# Patient Record
Sex: MALE | Race: BLACK OR AFRICAN AMERICAN | NOT HISPANIC OR LATINO | URBAN - METROPOLITAN AREA
[De-identification: names, ages, dates, MRNs, and addresses within clinical notes are randomized per-mention and may not be internally consistent; named-entity substitution may affect disease eponyms.]

---

## 2018-06-29 ENCOUNTER — OUTPATIENT (OUTPATIENT)
Dept: OUTPATIENT SERVICES | Facility: HOSPITAL | Age: 48
LOS: 1 days | Discharge: HOME | End: 2018-06-29

## 2018-06-29 DIAGNOSIS — E78.5 HYPERLIPIDEMIA, UNSPECIFIED: ICD-10-CM

## 2018-06-29 DIAGNOSIS — E55.9 VITAMIN D DEFICIENCY, UNSPECIFIED: ICD-10-CM

## 2018-06-29 DIAGNOSIS — N32.81 OVERACTIVE BLADDER: ICD-10-CM

## 2019-01-02 ENCOUNTER — OUTPATIENT (OUTPATIENT)
Dept: OUTPATIENT SERVICES | Facility: HOSPITAL | Age: 49
LOS: 1 days | Discharge: HOME | End: 2019-01-02

## 2019-01-02 DIAGNOSIS — N39.0 URINARY TRACT INFECTION, SITE NOT SPECIFIED: ICD-10-CM

## 2019-01-27 PROBLEM — Z00.00 ENCOUNTER FOR PREVENTIVE HEALTH EXAMINATION: Status: ACTIVE | Noted: 2019-01-27

## 2019-02-25 ENCOUNTER — OUTPATIENT (OUTPATIENT)
Dept: OUTPATIENT SERVICES | Facility: HOSPITAL | Age: 49
LOS: 1 days | Discharge: HOME | End: 2019-02-25

## 2019-02-25 ENCOUNTER — APPOINTMENT (OUTPATIENT)
Dept: UROLOGY | Facility: CLINIC | Age: 49
End: 2019-02-25
Payer: MEDICAID

## 2019-02-25 VITALS
DIASTOLIC BLOOD PRESSURE: 82 MMHG | SYSTOLIC BLOOD PRESSURE: 136 MMHG | HEART RATE: 78 BPM | HEIGHT: 67 IN | WEIGHT: 168 LBS | BODY MASS INDEX: 26.37 KG/M2

## 2019-02-25 DIAGNOSIS — Z78.9 OTHER SPECIFIED HEALTH STATUS: ICD-10-CM

## 2019-02-25 PROCEDURE — 99203 OFFICE O/P NEW LOW 30 MIN: CPT

## 2019-02-25 RX ORDER — AZITHROMYCIN 500 MG/1
500 TABLET, FILM COATED ORAL
Qty: 2 | Refills: 0 | Status: ACTIVE | COMMUNITY
Start: 2019-02-25 | End: 1900-01-01

## 2019-02-25 RX ORDER — CEFIXIME 400 MG/1
400 CAPSULE ORAL
Qty: 1 | Refills: 0 | Status: ACTIVE | COMMUNITY
Start: 2019-02-25 | End: 1900-01-01

## 2019-02-26 DIAGNOSIS — R35.0 FREQUENCY OF MICTURITION: ICD-10-CM

## 2019-02-26 LAB
APPEARANCE: CLEAR
BILIRUBIN URINE: NEGATIVE
BLOOD URINE: NEGATIVE
COLOR: YELLOW
GLUCOSE QUALITATIVE U: NEGATIVE MG/DL
KETONES URINE: NEGATIVE
LEUKOCYTE ESTERASE URINE: NEGATIVE
NITRITE URINE: NEGATIVE
PH URINE: 6
PROTEIN URINE: NEGATIVE MG/DL
SPECIFIC GRAVITY URINE: 1.02
UROBILINOGEN URINE: 0.2 MG/DL (ref 0.2–?)

## 2019-02-26 NOTE — ADDENDUM
[FreeTextEntry1] : I have seen and Evaluated the patient with LATHA Wilkerson\par I agree with the content of his progress note and the plan of care outlined\par \par 
Patient

## 2019-02-26 NOTE — LETTER BODY
[Dear  ___] : Dear  [unfilled], [Consult Letter:] : I had the pleasure of evaluating your patient, [unfilled]. [Please see my note below.] : Please see my note below. [Consult Closing:] : Thank you very much for allowing me to participate in the care of this patient.  If you have any questions, please do not hesitate to contact me. [Sincerely,] : Sincerely, [FreeTextEntry3] : Edson Reese MD, FACS\par

## 2019-02-26 NOTE — ASSESSMENT
[FreeTextEntry1] : This is a 48-year-old male with a history of bothersome lower urinary tract symptoms and Chlamydia exposure. He will begin cefixime 400 mg and azithromycin 1 g today, for exposure treatment, and will follow up with his PCP for STI testing tomorrow as scheduled. She understands the importance of followup regarding this issue and that he must bring us his test results from his PCP.\par A urinalysis and culture/sensitivity will be sent from the office today. He will obtain a renal/bladder sonogram and follow up in a few weeks for review.

## 2019-02-26 NOTE — PHYSICAL EXAM
[General Appearance - Well Developed] : well developed [General Appearance - Well Nourished] : well nourished [Normal Appearance] : normal appearance [Well Groomed] : well groomed [General Appearance - In No Acute Distress] : no acute distress [Edema] : no peripheral edema [Respiration, Rhythm And Depth] : normal respiratory rhythm and effort [Exaggerated Use Of Accessory Muscles For Inspiration] : no accessory muscle use [Abdomen Soft] : soft [Abdomen Tenderness] : non-tender [Costovertebral Angle Tenderness] : no ~M costovertebral angle tenderness [Urethral Meatus] : meatus normal [Penis Abnormality] : normal circumcised penis [Urinary Bladder Findings] : the bladder was normal on palpation [Scrotum] : the scrotum was normal [Epididymis] : the epididymides were normal [Testes Tenderness] : no tenderness of the testes [Testes Mass (___cm)] : there were no testicular masses [Normal Station and Gait] : the gait and station were normal for the patient's age [] : no rash [No Focal Deficits] : no focal deficits [Oriented To Time, Place, And Person] : oriented to person, place, and time [Affect] : the affect was normal [Mood] : the mood was normal [Not Anxious] : not anxious [Femoral Lymph Nodes Enlarged Bilaterally] : femoral [Inguinal Lymph Nodes Enlarged Bilaterally] : inguinal [FreeTextEntry1] : No Penile drainage with expression. Underwear dry

## 2019-02-27 LAB — BACTERIA UR CULT: NORMAL

## 2019-03-01 ENCOUNTER — FORM ENCOUNTER (OUTPATIENT)
Age: 49
End: 2019-03-01

## 2019-03-02 ENCOUNTER — OUTPATIENT (OUTPATIENT)
Dept: OUTPATIENT SERVICES | Facility: HOSPITAL | Age: 49
LOS: 1 days | Discharge: HOME | End: 2019-03-02

## 2019-03-02 DIAGNOSIS — R35.0 FREQUENCY OF MICTURITION: ICD-10-CM

## 2019-03-18 ENCOUNTER — APPOINTMENT (OUTPATIENT)
Dept: UROLOGY | Facility: CLINIC | Age: 49
End: 2019-03-18
Payer: MEDICAID

## 2019-03-18 VITALS — BODY MASS INDEX: 26.37 KG/M2 | HEIGHT: 67 IN | WEIGHT: 168 LBS

## 2019-03-18 DIAGNOSIS — A64 UNSPECIFIED SEXUALLY TRANSMITTED DISEASE: ICD-10-CM

## 2019-03-18 DIAGNOSIS — R35.0 FREQUENCY OF MICTURITION: ICD-10-CM

## 2019-03-18 PROCEDURE — 99213 OFFICE O/P EST LOW 20 MIN: CPT

## 2019-03-18 NOTE — PHYSICAL EXAM
[General Appearance - Well Nourished] : well nourished [Normal Appearance] : normal appearance [General Appearance - Well Developed] : well developed [Well Groomed] : well groomed [General Appearance - In No Acute Distress] : no acute distress [Edema] : no peripheral edema [] : no respiratory distress [Exaggerated Use Of Accessory Muscles For Inspiration] : no accessory muscle use [Respiration, Rhythm And Depth] : normal respiratory rhythm and effort [Affect] : the affect was normal [Mood] : the mood was normal [Oriented To Time, Place, And Person] : oriented to person, place, and time [No Focal Deficits] : no focal deficits [Not Anxious] : not anxious [Normal Station and Gait] : the gait and station were normal for the patient's age

## 2019-03-18 NOTE — ADDENDUM
[FreeTextEntry1] : Documented by Erick Wilkerson acting as a scribe for Dr. Edson Reese \par \par All medical record entries made by the Scribe were at my,  direction and\par personally dictated by me on 3/7/2019.  I have reviewed the chart and agree that the record\par accurately reflects my personal performance of the history, physical exam, procedure and imaging.  \par  \par \par \par \par

## 2019-03-18 NOTE — HISTORY OF PRESENT ILLNESS
[Urinary Urgency] : urinary urgency [Urinary Frequency] : urinary frequency [Nocturia] : nocturia [Bladder Spasm] : bladder spasm [Abdominal Pain] : abdominal pain [None] : None [FreeTextEntry1] : Alfred is a 48-year-old male who we have been following for suprapubic discomfort and bothersome lower urinary tract symptoms. The symptoms started approximately one month ago and had been getting progressively worse. He is complaining of feelings of incomplete bladder emptying with severe frequency/urgency.\par He was treated for similar symptoms approximately 6 or 7 years ago. He was placed on an unknown medication which helped resolve his symptoms.\par \par Additionally, he reports that he just found out that he was exposed to Chlamydia. He states he symptoms have been ongoing and prior to him being exposed to Chlamydia. \par \par At his last visit, he was treated for chlamydia exposure with azithromycin and and Suprax. He states post antibiotic use his symptoms mostly resolved. He is now voiding well with some abdominal discomfort.\par \par He was scheduled to undergo STI testing with his PCP however, he did not do so.\par \par UA C&S negative\par \par Renal/bladder sonogram negative [Erectile Dysfunction] : no Erectile Dysfunction [Dysuria] : no dysuria

## 2019-03-18 NOTE — ASSESSMENT
[FreeTextEntry1] : This is a 48-year-old male with a history of bothersome lower urinary tract symptoms and Chlamydia exposure. His symptoms improved post cefixime 400 mg and azithromycin 1 g for exposure.\par He will obtain full STI testing and follow up in a few weeks for review.\par \par

## 2019-03-26 ENCOUNTER — LABORATORY RESULT (OUTPATIENT)
Age: 49
End: 2019-03-26

## 2019-03-26 ENCOUNTER — OUTPATIENT (OUTPATIENT)
Dept: OUTPATIENT SERVICES | Facility: HOSPITAL | Age: 49
LOS: 1 days | Discharge: HOME | End: 2019-03-26

## 2019-03-26 DIAGNOSIS — A64 UNSPECIFIED SEXUALLY TRANSMITTED DISEASE: ICD-10-CM

## 2019-03-27 LAB
HBV CORE IGG+IGM SER QL: NONREACTIVE
HBV CORE IGM SER QL: NONREACTIVE
HBV SURFACE AB SER QL: NONREACTIVE
HBV SURFACE AG SER QL: NONREACTIVE
HCV AB SER QL: NONREACTIVE
HCV S/CO RATIO: 0.08 S/CO
HIV1+2 AB SPEC QL IA.RAPID: NONREACTIVE
T PALLIDUM AB SER QL IA: NEGATIVE

## 2019-03-28 LAB
HSV 1+2 IGG SER IA-IMP: NEGATIVE
HSV 1+2 IGG SER IA-IMP: POSITIVE
HSV1 IGG SER QL: 34.6 INDEX
HSV2 IGG SER QL: 0.1 INDEX

## 2019-03-29 LAB
HSV1 IGM SER QL: NORMAL TITER
HSV2 AB FLD-ACNC: NORMAL TITER

## 2019-04-01 ENCOUNTER — APPOINTMENT (OUTPATIENT)
Dept: UROLOGY | Facility: CLINIC | Age: 49
End: 2019-04-01
Payer: MEDICAID

## 2019-04-01 VITALS — BODY MASS INDEX: 26.37 KG/M2 | HEIGHT: 67 IN | WEIGHT: 168 LBS

## 2019-04-01 PROCEDURE — 99213 OFFICE O/P EST LOW 20 MIN: CPT

## 2019-04-01 NOTE — HISTORY OF PRESENT ILLNESS
[Urinary Urgency] : urinary urgency [Urinary Frequency] : urinary frequency [Nocturia] : nocturia [Bladder Spasm] : bladder spasm [Abdominal Pain] : abdominal pain [None] : None [FreeTextEntry1] : 48 year-old male here to review the results of his recent testing\par \par GC /Chlamydia - negative\par trichomonas - negative\par Hep B/C - negative\par Syphilis - negative\par HIV - negative\par HSV 1/2 - negative\par \par all the results were conveyed in detail\par  [Erectile Dysfunction] : no Erectile Dysfunction [Dysuria] : no dysuria

## 2019-04-01 NOTE — ASSESSMENT
[Urinary Symptom or Sign (788.99\R39.89)] : implantation [FreeTextEntry1] :  48-year-old male with a history of bothersome lower urinary tract symptoms and Chlamydia exposure. His symptoms improved post cefixime 400 mg and azithromycin 1 g for exposure.\par \par his lab results were all negative\par results reviewed with patient\par safe sex practices re-inforced\par \par - he will F/U as needed\par \par

## 2020-01-30 ENCOUNTER — TRANSCRIPTION ENCOUNTER (OUTPATIENT)
Age: 50
End: 2020-01-30

## 2020-02-08 ENCOUNTER — EMERGENCY (EMERGENCY)
Facility: HOSPITAL | Age: 50
LOS: 0 days | Discharge: HOME | End: 2020-02-08
Attending: EMERGENCY MEDICINE | Admitting: EMERGENCY MEDICINE
Payer: MEDICAID

## 2020-02-08 VITALS
DIASTOLIC BLOOD PRESSURE: 78 MMHG | OXYGEN SATURATION: 98 % | TEMPERATURE: 98 F | RESPIRATION RATE: 18 BRPM | SYSTOLIC BLOOD PRESSURE: 131 MMHG | WEIGHT: 167.99 LBS | HEART RATE: 82 BPM

## 2020-02-08 DIAGNOSIS — R39.198 OTHER DIFFICULTIES WITH MICTURITION: ICD-10-CM

## 2020-02-08 DIAGNOSIS — R10.9 UNSPECIFIED ABDOMINAL PAIN: ICD-10-CM

## 2020-02-08 DIAGNOSIS — R35.0 FREQUENCY OF MICTURITION: ICD-10-CM

## 2020-02-08 DIAGNOSIS — R10.30 LOWER ABDOMINAL PAIN, UNSPECIFIED: ICD-10-CM

## 2020-02-08 LAB
APPEARANCE UR: CLEAR — SIGNIFICANT CHANGE UP
BILIRUB UR-MCNC: NEGATIVE — SIGNIFICANT CHANGE UP
COLOR SPEC: SIGNIFICANT CHANGE UP
DIFF PNL FLD: NEGATIVE — SIGNIFICANT CHANGE UP
GLUCOSE UR QL: NEGATIVE — SIGNIFICANT CHANGE UP
KETONES UR-MCNC: NEGATIVE — SIGNIFICANT CHANGE UP
LEUKOCYTE ESTERASE UR-ACNC: NEGATIVE — SIGNIFICANT CHANGE UP
NITRITE UR-MCNC: NEGATIVE — SIGNIFICANT CHANGE UP
PH UR: 6 — SIGNIFICANT CHANGE UP (ref 5–8)
PROT UR-MCNC: NEGATIVE — SIGNIFICANT CHANGE UP
SP GR SPEC: 1.02 — SIGNIFICANT CHANGE UP (ref 1.01–1.02)
UROBILINOGEN FLD QL: SIGNIFICANT CHANGE UP

## 2020-02-08 PROCEDURE — 99284 EMERGENCY DEPT VISIT MOD MDM: CPT

## 2020-02-08 RX ORDER — CEFTRIAXONE 500 MG/1
250 INJECTION, POWDER, FOR SOLUTION INTRAMUSCULAR; INTRAVENOUS ONCE
Refills: 0 | Status: COMPLETED | OUTPATIENT
Start: 2020-02-08 | End: 2020-02-08

## 2020-02-08 RX ORDER — AZITHROMYCIN 500 MG/1
1 TABLET, FILM COATED ORAL ONCE
Refills: 0 | Status: COMPLETED | OUTPATIENT
Start: 2020-02-08 | End: 2020-02-08

## 2020-02-08 RX ADMIN — AZITHROMYCIN 1 GRAM(S): 500 TABLET, FILM COATED ORAL at 07:45

## 2020-02-08 RX ADMIN — CEFTRIAXONE 250 MILLIGRAM(S): 500 INJECTION, POWDER, FOR SOLUTION INTRAMUSCULAR; INTRAVENOUS at 07:45

## 2020-02-08 NOTE — ED ADULT NURSE NOTE - CHPI ED NUR SYMPTOMS NEG
no abdominal distension/no dysuria/no hematuria/no nausea/no diarrhea/no blood in stool/no chills/no fever

## 2020-02-08 NOTE — ED PROVIDER NOTE - NS ED ROS FT
Constitutional: (-) fever  Eyes/ENT: (-) blurry vision, (-) epistaxis  Cardiovascular: (-) chest pain, (-) syncope  Respiratory: (-) cough, (-) shortness of breath  Gastrointestinal: (-) vomiting, (-) diarrhea  : (+) suprapubic pain, (+) incomplete void  Musculoskeletal: (-) neck pain, (-) back pain, (-) joint pain  Integumentary: (-) rash, (-) edema  Neurological: (-) headache, (-) altered mental status  Allergic/Immunologic: (-) pruritus

## 2020-02-08 NOTE — ED PROVIDER NOTE - OBJECTIVE STATEMENT
49y M no ppmh presents for eval of suprapubic pain. Pt states he has 2days of mild suprapubic discomfort, aggravated with urination and activity, relieved at rest. Pt had similar sxs x1yr ago that was STI. Denies fever, ha, cp, sob, weakness, numbness, hematuria, flank pain, n/v/d/c

## 2020-02-08 NOTE — ED ADULT NURSE NOTE - OBJECTIVE STATEMENT
Pt c/o abd pain and suprapubic, denies frequency, no burning no odor. C/o inability to empty the bladder fully

## 2020-02-08 NOTE — ED PROVIDER NOTE - CLINICAL SUMMARY MEDICAL DECISION MAKING FREE TEXT BOX
a/p; LUTS, more likely STI than UTI, will do ua/cx, gc/chlam and treat for STI w ctx and azithro, f/u with his urologist 1 week, strict return precautions provided, safe sex practices reinforced, H&P not consistent w pyelo, renal colic, testicular torsion, incarcerated hernia.

## 2020-02-08 NOTE — ED PROVIDER NOTE - NSFOLLOWUPINSTRUCTIONS_ED_ALL_ED_FT
Follow up with PMD in 1-2 days.    A sexually transmitted disease (STD) is a disease or infection that may be passed (transmitted) from person to person, usually during sexual activity. This may happen by way of saliva, semen, blood, vaginal mucus, or urine. Symptoms vary depending on the type of STD acquired and may include pain in the groin, discharge, and lesions or a rash. If you are started on an antibiotic, take it exactly as instructed. Avoid sexual contact of any kind until cleared by a health care professional. Contact your sexual partner(s) to inform them of your diagnosis so that they may be tested and treated as well.    SEEK IMMEDIATE MEDICAL CARE IF YOU HAVE ANY OF THE FOLLOWING SYMPTOMS: severe abdominal pain, high fever, nausea/vomiting, or unintended weight loss.

## 2020-02-08 NOTE — ED PROVIDER NOTE - ATTENDING CONTRIBUTION TO CARE
49M no pmh, p/w 1 week of suprapubic pressure constant nonradiating assoc w urinary frequency and sensation of incomplete bladder emptying. states he had same exact symptoms 1 yr ago seen by Dr Reese, states he had STI exposure however ua/cx and STI testing neg, renal sono neg, pt treated w azithromycin and Suprax and symptoms resolved. admits to unprotected intercourse. No dysuria, hematuria. No flank pain. No testicular pain. No lesions or discharge. No fever.  No cp, sob. No heavy lifting, bulge's.     on exam, AFVSS, well christen nad, ncat, eomi, perrla, mmm, lctab, rrr nl s1s2 no mrg, abd soft ntnd, no cvat,  agree w LATHA Skaggs, circumcised, cremasteric intact bilat, aaox3, no focal deficits, no le edema or calf ttp,     a/p; LUTS, more likely STI than UTI, will do ua/cx, gc/chlam and treat for STI w ctx and azithro, f/u with his urologist 1 week, strict return precautions provided, safe sex practices reinforced, H&P not consistent w pyelo, renal colic, testicular torsion, incarcerated hernia.

## 2020-02-08 NOTE — ED ADULT NURSE NOTE - GASTROINTESTINAL ASSESSMENT
April 20, 2018       Patient: Kartik Mahoney   YOB: 1975   Date of Visit: 4/20/2018         To Whom It May Concern:    It is my medical opinion that Kartik Mahoney be excused from work tomorrow due to illness.    If you have any questions or concerns, please don't hesitate to call [phone]          Sincerely,          DAVID Chase.  Electronically Signed     
WDL

## 2020-02-08 NOTE — ED ADULT TRIAGE NOTE - CHIEF COMPLAINT QUOTE
Pt came c/o suprapubic pain, increase in frequency in urination and unable to empty his bladder fully x 2 days.

## 2020-02-08 NOTE — ED PROVIDER NOTE - NSFOLLOWUPCLINICS_GEN_ALL_ED_FT
[facility]  Medicine  [address], [place]   Phone: [phone]  Fax:   Follow Up Time:

## 2020-02-08 NOTE — ED PROVIDER NOTE - PHYSICAL EXAMINATION
CONST: NAD  EYES: Sclera and conjunctiva clear.   ENT: No nasal discharge. Oropharynx normal appearing, no erythema or exudates. No abscess or swelling. Uvula midline.   NECK: Non-tender, no meningeal signs. normal ROM. supple   CARD: S1 S2; No jvd  RESP: Equal BS B/L, No wheezes, rhonchi or rales. No distress  GI: Soft, non-tender, non-distended. no cva tenderness. normal BS  : External appearance wnl, nontender testicles, no dc, (-) phrens, (+) cremasteric, no palpable hernia  MS: Normal ROM in all extremities. pulses 2 +. no calf tenderness or swelling  SKIN: Warm, dry, no acute rashes. Good turgor  NEURO: A&Ox3, No focal deficits. Strength 5/5 with no sensory deficits. Steady gait.

## 2020-02-08 NOTE — ED PROVIDER NOTE - PATIENT PORTAL LINK FT
You can access the FollowMyHealth Patient Portal offered by [facility] by registering at the following website: [email or web address]. By joining [employer]’s FollowMyHealth portal, you will also be able to view your health information using other applications (apps) compatible with our system.

## 2020-02-09 LAB
CULTURE RESULTS: NO GROWTH — SIGNIFICANT CHANGE UP
SPECIMEN SOURCE: SIGNIFICANT CHANGE UP

## 2020-02-14 PROBLEM — Z78.9 OTHER SPECIFIED HEALTH STATUS: Chronic | Status: ACTIVE | Noted: 2020-02-08

## 2020-02-24 ENCOUNTER — APPOINTMENT (OUTPATIENT)
Dept: UROLOGY | Facility: CLINIC | Age: 50
End: 2020-02-24
Payer: MEDICAID

## 2020-02-24 VITALS — WEIGHT: 168 LBS | BODY MASS INDEX: 26.37 KG/M2 | HEIGHT: 67 IN

## 2020-02-24 DIAGNOSIS — R10.9 UNSPECIFIED ABDOMINAL PAIN: ICD-10-CM

## 2020-02-24 DIAGNOSIS — R39.9 UNSPECIFIED SYMPTOMS AND SIGNS INVOLVING THE GENITOURINARY SYSTEM: ICD-10-CM

## 2020-02-24 PROCEDURE — 99214 OFFICE O/P EST MOD 30 MIN: CPT

## 2020-02-24 RX ORDER — SULFAMETHOXAZOLE AND TRIMETHOPRIM 800; 160 MG/1; MG/1
800-160 TABLET ORAL TWICE DAILY
Qty: 28 | Refills: 2 | Status: ACTIVE | COMMUNITY
Start: 2020-02-24 | End: 1900-01-01

## 2020-02-24 NOTE — ASSESSMENT
[Urinary Symptom or Sign (788.99\R39.89)] : implantation [FreeTextEntry1] : 49-year-old male with a history of bothersome lower urinary tract symptoms \par \par his lab results were all negative\par \par - CT scan a/p to look for stones and peritoneal pathology\par - UA, Cx, Cytology\par - PSA\par - f/u to review above in 3 weeks\par \par

## 2020-02-24 NOTE — HISTORY OF PRESENT ILLNESS
[Urinary Urgency] : urinary urgency [Urinary Frequency] : urinary frequency [Nocturia] : nocturia [Bladder Spasm] : bladder spasm [Abdominal Pain] : abdominal pain [None] : None [FreeTextEntry1] : 49 year-old male c/o suprapubic pain\par last seen in er with above\par \par negative UA and culture\par no in retention [Erectile Dysfunction] : no Erectile Dysfunction [Dysuria] : no dysuria

## 2020-02-27 LAB
BACTERIA UR CULT: NORMAL
URINE CYTOLOGY: NORMAL

## 2020-03-28 ENCOUNTER — OUTPATIENT (OUTPATIENT)
Dept: OUTPATIENT SERVICES | Facility: HOSPITAL | Age: 50
LOS: 1 days | Discharge: HOME | End: 2020-03-28
Payer: MEDICAID

## 2020-03-28 ENCOUNTER — RESULT REVIEW (OUTPATIENT)
Age: 50
End: 2020-03-28

## 2020-03-28 DIAGNOSIS — R10.9 UNSPECIFIED ABDOMINAL PAIN: ICD-10-CM

## 2020-03-28 DIAGNOSIS — R35.0 FREQUENCY OF MICTURITION: ICD-10-CM

## 2020-03-28 PROCEDURE — 74176 CT ABD & PELVIS W/O CONTRAST: CPT | Mod: 26

## 2020-04-06 DIAGNOSIS — R93.2 ABNORMAL FINDINGS ON DIAGNOSTIC IMAGING OF LIVER AND BILIARY TRACT: ICD-10-CM

## 2020-04-06 LAB
ANION GAP SERPL CALC-SCNC: 14 MMOL/L
BUN SERPL-MCNC: 16 MG/DL
CALCIUM SERPL-MCNC: 9.7 MG/DL
CHLORIDE SERPL-SCNC: 98 MMOL/L
CO2 SERPL-SCNC: 26 MMOL/L
CREAT SERPL-MCNC: 1.1 MG/DL
GLUCOSE SERPL-MCNC: 91 MG/DL
POTASSIUM SERPL-SCNC: 4.6 MMOL/L
PSA FREE FLD-MCNC: 25 %
PSA FREE SERPL-MCNC: 0.25 NG/ML
PSA SERPL-MCNC: 0.98 NG/ML
SODIUM SERPL-SCNC: 138 MMOL/L

## 2020-05-04 ENCOUNTER — APPOINTMENT (OUTPATIENT)
Dept: UROLOGY | Facility: CLINIC | Age: 50
End: 2020-05-04

## 2020-07-11 ENCOUNTER — EMERGENCY (EMERGENCY)
Facility: HOSPITAL | Age: 50
LOS: 0 days | Discharge: HOME | End: 2020-07-11
Attending: EMERGENCY MEDICINE | Admitting: EMERGENCY MEDICINE
Payer: MEDICAID

## 2020-07-11 VITALS
SYSTOLIC BLOOD PRESSURE: 143 MMHG | TEMPERATURE: 99 F | DIASTOLIC BLOOD PRESSURE: 84 MMHG | RESPIRATION RATE: 18 BRPM | OXYGEN SATURATION: 100 % | HEART RATE: 80 BPM
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
## 2020-07-11 DIAGNOSIS — R23.4 CHANGES IN SKIN TEXTURE: ICD-10-CM

## 2020-07-11 DIAGNOSIS — Z23 ENCOUNTER FOR IMMUNIZATION: ICD-10-CM

## 2020-07-11 DIAGNOSIS — L53.9 ERYTHEMATOUS CONDITION, UNSPECIFIED: ICD-10-CM
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
## 2020-07-11 DIAGNOSIS — M79.672 PAIN IN LEFT FOOT: ICD-10-CM

## 2020-07-11 PROCEDURE — 73630 X-RAY EXAM OF FOOT: CPT | Mod: 26,LT

## 2020-07-11 PROCEDURE — 99283 EMERGENCY DEPT VISIT LOW MDM: CPT

## 2020-07-11 RX ORDER — TETANUS TOXOID, REDUCED DIPHTHERIA TOXOID AND ACELLULAR PERTUSSIS VACCINE, ADSORBED 5; 2.5; 8; 8; 2.5 [IU]/.5ML; [IU]/.5ML; UG/.5ML; UG/.5ML; UG/.5ML
0.5 SUSPENSION INTRAMUSCULAR ONCE
Refills: 0 | Status: COMPLETED | OUTPATIENT
Start: 2020-07-11 | End: 2020-07-11

## 2020-07-11 RX ADMIN — TETANUS TOXOID, REDUCED DIPHTHERIA TOXOID AND ACELLULAR PERTUSSIS VACCINE, ADSORBED 0.5 MILLILITER(S): 5; 2.5; 8; 8; 2.5 SUSPENSION INTRAMUSCULAR at 19:00

## 2020-07-11 NOTE — ED PROVIDER NOTE - ATTENDING CONTRIBUTION TO CARE
48 yo m with no pmh, presents with L foot pain x 2 weeks.  pt says he stepped on a thumbtack without shoes.  pt says he noted a worsening lump.  no fever, no chills.  pt say only has pain on the immediate site.  exam: +induration medial/distal to the heel., no fluctuance, mildly erythematous imp: pt with indurated area at the sole of foot where he stepped on thumbtack, mildly erythematous, tender, will start on abx and pt to f/u with podiatry outpt

## 2020-07-11 NOTE — ED PROVIDER NOTE - NSFOLLOWUPINSTRUCTIONS_ED_ALL_ED_FT
Foot Pain    Many things can cause foot pain. Some common causes are:    An injury.  A sprain.  Arthritis.  Blisters.  Bunions.    Follow these instructions at home:  Pay attention to any changes in your symptoms. Take these actions to help with your discomfort:    If directed, put ice on the affected area:    Put ice in a plastic bag.  Place a towel between your skin and the bag.  Leave the ice on for 15–20 minutes, 3?4 times a day for 2 days.    Take over-the-counter and prescription medicines only as told by your health care provider.  Wear comfortable, supportive shoes that fit you well. Do not wear high heels.  Do not stand or walk for long periods of time.  Do not lift a lot of weight. This can put added pressure on your feet.  Do stretches to relieve foot pain and stiffness as told by your health care provider.  Rub your foot gently.  Keep your feet clean and dry.    Contact a health care provider if:  Your pain does not get better after a few days of self-care.  Your pain gets worse.  You cannot stand on your foot.  Get help right away if:  Your foot is numb or tingling.  Your foot or toes are swollen.  Your foot or toes turn white or blue.  You have warmth and redness along your foot.  This information is not intended to replace advice given to you by your health care provider. Make sure you discuss any questions you have with your health care provider.

## 2020-07-11 NOTE — ED PROVIDER NOTE - NSFOLLOWUPCLINICS_GEN_ALL_ED_FT
[facility]  Podiatry  .  [place]   Phone: [phone]  Fax:   Follow Up Time:

## 2020-07-11 NOTE — ED PROVIDER NOTE - PATIENT PORTAL LINK FT
You can access the FollowMyHealth Patient Portal offered by [facility] by registering at the following website: [email or web address]. By joining [employer]’s FollowMyHealth portal, you will also be able to view your health information using other applications (apps) compatible with our system.

## 2020-07-11 NOTE — ED PROVIDER NOTE - OBJECTIVE STATEMENT
pt c/o left foot pain for 2 weeks since stepping on a thumbtack with barefeet. pain is sharp, nonradiating, moderate. denies exacerbating or relieving factors. Denies fever/chill/HA/dizziness/chest pain/palpitation/sob/abd pain/n/v/d/ black stool/bloody stool/urinary sxs

## 2020-07-11 NOTE — ED PROVIDER NOTE - PROGRESS NOTE DETAILS
Pt aware that we will have official radiology read pending, and may result in change of xray read.  Pt voices understanding of use of medications, instructions for care, and reasons to return or to go to ED  Discussed rest, ice, compression, and elevation with patient.   Discussed NSAIDs for anti-inflammatory and pain relief.  Patient advised to f/u with podiatry

## 2020-07-11 NOTE — ED PROVIDER NOTE - CLINICAL SUMMARY MEDICAL DECISION MAKING FREE TEXT BOX
pt with indurated area at the sole of foot where he stepped on thumbtack, mildly erythematous, tender, will start on abx and pt to f/u with podiatry outpt

## 2020-07-11 NOTE — ED PROVIDER NOTE - PHYSICAL EXAMINATION
CONSTITUTIONAL: Well-appearing; well-nourished; in no apparent distress.   MS: No evidence of trauma or deformity. Normal ROM in all four extremities; non-tender to palpation; distal pulses are normal.   SKIN: no wound noted; Normal for age and race; warm; dry; good turgor; no apparent lesions or exudate.   NEURO/PSYCH: A & O x 4; grossly unremarkable. mood and manner are appropriate. Grooming and personal hygiene are appropriate. No apparent thoughts of harm to self or others.

## 2020-11-03 NOTE — ED ADULT NURSE NOTE - NURSING GU BLADDER
non-tender/non-distended Tranexamic Acid Counseling:  Patient advised of the small risk of bleeding problems with tranexamic acid. They were also instructed to call if they developed any nausea, vomiting or diarrhea. All of the patient's questions and concerns were addressed.

## 2021-02-04 ENCOUNTER — APPOINTMENT (OUTPATIENT)
Dept: UROLOGY | Facility: CLINIC | Age: 51
End: 2021-02-04
Payer: MEDICAID

## 2021-02-04 VITALS — HEIGHT: 67 IN | TEMPERATURE: 97.2 F | WEIGHT: 168 LBS | BODY MASS INDEX: 26.37 KG/M2

## 2021-02-04 DIAGNOSIS — M62.838 OTHER MUSCLE SPASM: ICD-10-CM

## 2021-02-04 PROCEDURE — 99072 ADDL SUPL MATRL&STAF TM PHE: CPT

## 2021-02-04 PROCEDURE — 99214 OFFICE O/P EST MOD 30 MIN: CPT

## 2021-02-05 PROBLEM — M62.838 MUSCLE SPASM: Status: ACTIVE | Noted: 2021-02-05

## 2021-02-05 NOTE — ASSESSMENT
[Chronic Prostatitis (601.1\N41.1)] : of ~T knee [FreeTextEntry1] : 51 yo with persistent suprapubic pain\par intermittent in nature\par very bothersome\par \par - will begin flomax\par - as per note from April - he did not have the MRI \par not certain if Dr. Pagan had obtained the report\par - will obtain a LIVER MRI to further assess\par - repeat PSA\par - f/u in 6 weeks to re-assess

## 2021-02-05 NOTE — LETTER BODY
[Dear  ___] : Dear  [unfilled], [Consult Letter:] : I had the pleasure of evaluating your patient, [unfilled]. [Please see my note below.] : Please see my note below. [Sincerely,] : Sincerely, [FreeTextEntry3] : Edson Reese MD, FACS\par

## 2021-02-05 NOTE — HISTORY OF PRESENT ILLNESS
[Urinary Urgency] : urinary urgency [Urinary Frequency] : urinary frequency [Nocturia] : nocturia [Bladder Spasm] : bladder spasm [Abdominal Pain] : abdominal pain [None] : None [FreeTextEntry1] : 50 year-old male c/o suprapubic pain\par and discomfort\par \par prior workup consisted of a CT scan and STD panel\par all normal except for a liver lesion that required an MRI for further characterization\par the report was forwarded to Dr. Pagan's office to further evaluate \par \par the patient is presently afebrile\par no distress\par \par 3/2020\par negative UA and culture\par PSA 0.98 and 25% free\par \par he is not on alpha blockers [Erectile Dysfunction] : no Erectile Dysfunction [Dysuria] : no dysuria

## 2021-02-09 LAB
ANION GAP SERPL CALC-SCNC: 10 MMOL/L
BUN SERPL-MCNC: 14 MG/DL
CALCIUM SERPL-MCNC: 10.1 MG/DL
CHLORIDE SERPL-SCNC: 98 MMOL/L
CO2 SERPL-SCNC: 27 MMOL/L
CREAT SERPL-MCNC: 1.2 MG/DL
GLUCOSE SERPL-MCNC: 98 MG/DL
POTASSIUM SERPL-SCNC: 4 MMOL/L
SODIUM SERPL-SCNC: 135 MMOL/L

## 2021-02-10 LAB
APPEARANCE: CLEAR
BILIRUBIN URINE: NEGATIVE
BLOOD URINE: NEGATIVE
COLOR: NORMAL
GLUCOSE QUALITATIVE U: NEGATIVE
KETONES URINE: NEGATIVE
LEUKOCYTE ESTERASE URINE: NEGATIVE
NITRITE URINE: NEGATIVE
PH URINE: 5.5
PROTEIN URINE: NEGATIVE
SPECIFIC GRAVITY URINE: 1.02
UROBILINOGEN URINE: NORMAL

## 2021-02-16 ENCOUNTER — NON-APPOINTMENT (OUTPATIENT)
Age: 51
End: 2021-02-16

## 2021-02-16 LAB
BACTERIA FLD CULT: NORMAL
PSA FREE FLD-MCNC: 26 %
PSA FREE SERPL-MCNC: 0.28 NG/ML
PSA SERPL-MCNC: 1.07 NG/ML

## 2021-03-11 ENCOUNTER — APPOINTMENT (OUTPATIENT)
Dept: UROLOGY | Facility: CLINIC | Age: 51
End: 2021-03-11
Payer: MEDICAID

## 2021-03-11 DIAGNOSIS — R93.49 ABNORMAL RADIOLOGIC FINDINGS ON DIAGNOSITIC IMAGING OF OTHER URINARY ORGANS: ICD-10-CM

## 2021-03-11 PROCEDURE — 99213 OFFICE O/P EST LOW 20 MIN: CPT

## 2021-03-11 PROCEDURE — 99072 ADDL SUPL MATRL&STAF TM PHE: CPT

## 2021-03-16 PROBLEM — R93.49 ABNORMAL FINDINGS ON DIAGNOSTIC IMAGING OF URINARY ORGANS: Status: ACTIVE | Noted: 2021-02-05

## 2021-03-16 NOTE — ASSESSMENT
[FreeTextEntry1] : 49 yo with persistent suprapubic pain\par intermittent in nature\par very bothersome\par \par -  flomax as needed \par - MRI of the abdomen - LIVER protocol\par - telemed to review

## 2021-03-16 NOTE — HISTORY OF PRESENT ILLNESS
[Urinary Urgency] : urinary urgency [Urinary Frequency] : urinary frequency [Nocturia] : nocturia [Bladder Spasm] : bladder spasm [Abdominal Pain] : abdominal pain [None] : None [FreeTextEntry1] : 50 year-old male c/o suprapubic pain\par and discomfort - seen 2/4/2021 for above complaint\par \par prior workup consisted of a CT scan and STD panel\par all normal except for a liver lesion that required an MRI for further characterization\par - MRI was not performed \par \par the patient is presently afebrile\par no distress\par \par 3/2020\par negative UA and culture\par PSA 0.98 and 25% free\par semen culture no growth\par  [Erectile Dysfunction] : no Erectile Dysfunction [Dysuria] : no dysuria

## 2021-03-25 ENCOUNTER — APPOINTMENT (OUTPATIENT)
Dept: UROLOGY | Facility: CLINIC | Age: 51
End: 2021-03-25
Payer: MEDICAID

## 2021-03-25 VITALS — TEMPERATURE: 97 F | HEIGHT: 67 IN | WEIGHT: 168 LBS | BODY MASS INDEX: 26.37 KG/M2

## 2021-03-25 PROCEDURE — 99213 OFFICE O/P EST LOW 20 MIN: CPT

## 2021-03-25 PROCEDURE — 99072 ADDL SUPL MATRL&STAF TM PHE: CPT

## 2021-03-25 NOTE — ASSESSMENT
[FreeTextEntry1] : Patient presents to office today to discuss MRI of abdomen Liver protocol. Patient did not get MRI due to anxiety about IV needle. Patient states that he will get MRI this week as he is now mentally prepared.\par \par Plan\par -Follow up telehealth to review MRI.  \par \par

## 2021-03-25 NOTE — ADDENDUM
[FreeTextEntry1] : Documented by LATHA William acting as a scribe for Dr. Edson Reese \par \par All medical record entries made by the Scribe were at my,  direction and\par personally dictated by me.  I have reviewed the chart and agree that the record\par accurately reflects my personal performance of the history, physical exam, procedure and imaging.  \par  \par \par

## 2021-03-25 NOTE — REVIEW OF SYSTEMS
[see HPI] : see HPI [Fever] : no fever [Chills] : no chills [Sore Throat] : no sore throat [Chest Pain] : no chest pain [Shortness Of Breath] : no shortness of breath [Cough] : no cough [Wheezing] : no wheezing [Abdominal Pain] : no abdominal pain [Vomiting] : no vomiting [Constipation] : no constipation [Diarrhea] : no diarrhea

## 2021-03-25 NOTE — PHYSICAL EXAM
[Normal Appearance] : normal appearance [Well Groomed] : well groomed [General Appearance - In No Acute Distress] : no acute distress [Skin Color & Pigmentation] : normal skin color and pigmentation [Edema] : no peripheral edema [] : no respiratory distress [Oriented To Time, Place, And Person] : oriented to person, place, and time [Normal Station and Gait] : the gait and station were normal for the patient's age [No Focal Deficits] : no focal deficits

## 2021-03-25 NOTE — HISTORY OF PRESENT ILLNESS
[None] : no symptoms [FreeTextEntry1] : 50 year-old male c/o suprapubic pain\par and discomfort - seen 2/4/2021 for above complaint\par \par prior workup consisted of a CT scan and STD panel\par all normal except for a liver lesion that required an MRI for further characterization\par - MRI was not performed again. Patient states that he did not know the MRI required IV needle and had anxiety. Patient states that he is mentally prepared and will get the MRI this week. \par \par the patient is presently afebrile\par no distress\par

## 2021-04-02 ENCOUNTER — OUTPATIENT (OUTPATIENT)
Dept: OUTPATIENT SERVICES | Facility: HOSPITAL | Age: 51
LOS: 1 days | Discharge: HOME | End: 2021-04-02
Payer: MEDICAID

## 2021-04-02 DIAGNOSIS — R93.2 ABNORMAL FINDINGS ON DIAGNOSTIC IMAGING OF LIVER AND BILIARY TRACT: ICD-10-CM

## 2021-04-02 PROCEDURE — 74183 MRI ABD W/O CNTR FLWD CNTR: CPT | Mod: 26

## 2021-04-08 ENCOUNTER — APPOINTMENT (OUTPATIENT)
Dept: UROLOGY | Facility: CLINIC | Age: 51
End: 2021-04-08
Payer: MEDICAID

## 2021-04-08 DIAGNOSIS — N41.1 CHRONIC PROSTATITIS: ICD-10-CM

## 2021-04-08 PROCEDURE — ZZZZZ: CPT

## 2021-04-12 PROBLEM — N41.1 CHRONIC PROSTATITIS: Status: ACTIVE | Noted: 2021-02-05

## 2021-04-12 NOTE — ASSESSMENT
[FreeTextEntry1] : 51 yo with abnormal imaging of the liver\par s/p MRI\par \par - MRI reviewed with the patient\par - PSA reviewed\par - all lab studies reviewed \par - chronic prostatitis\par avoidance of spicy food and caffeine re-iterated\par - f/u as needed [Chronic Prostatitis (601.1\N41.1)] : of ~T knee

## 2021-04-12 NOTE — PHYSICAL EXAM
[] : no respiratory distress [Oriented To Time, Place, And Person] : oriented to person, place, and time [Not Anxious] : not anxious

## 2021-04-12 NOTE — HISTORY OF PRESENT ILLNESS
[Home] : at home, [unfilled] , at the time of the visit. [Medical Office: ([facility])___] : at the medical office located in  [Verbal consent obtained from patient] : the patient, [unfilled] [FreeTextEntry1] : telephone [phone]\par email [email or web address]\par \par 50 year-old male c/o suprapubic pain\par and discomfort - seen 2/4/2021 for above complaint\par \par prior workup consisted of a CT scan and STD panel\par all normal except for a liver lesion that required an MRI for further characterization\par - MRI was not performed \par \par the patient is presently afebrile\par no distress\par \par 3/2020\par negative UA and culture\par PSA 0.98 and 25% free\par semen culture no growth\par \par MRI 4/2021\par \par IMPRESSION:\par 1. Scattered subcentimeter simple hepatic cysts and a 1.2 cm caudate lobe hemangioma; no suspicious hepatic lesions.\par 2. Otherwise, unremarkable MRI abdomen.

## 2022-07-01 ENCOUNTER — NON-APPOINTMENT (OUTPATIENT)
Age: 52
End: 2022-07-01

## 2022-07-14 ENCOUNTER — APPOINTMENT (OUTPATIENT)
Dept: UROLOGY | Facility: CLINIC | Age: 52
End: 2022-07-14

## 2022-07-14 DIAGNOSIS — N41.9 INFLAMMATORY DISEASE OF PROSTATE, UNSPECIFIED: ICD-10-CM

## 2022-07-14 PROCEDURE — 99214 OFFICE O/P EST MOD 30 MIN: CPT

## 2022-07-21 PROBLEM — N41.9 PROSTATITIS: Status: ACTIVE | Noted: 2021-03-16

## 2022-07-21 NOTE — ASSESSMENT
[FreeTextEntry1] : 50 yo with persistent suprapubic pain\par intermittent in nature\par at present very bothersome\par \par - UA, CUlture, cytology\par - PSA\par - telehealth to review studies\par \par

## 2022-07-21 NOTE — HISTORY OF PRESENT ILLNESS
[FreeTextEntry1] : 51 year-old male seen prior with c/o suprapubic pain\par and discomfort - seen 3/2021 for above complaint - was worked up and suspected prostatitis\par \par UA, CUlture obtained\par PSA obtained\par PVR 20 cc\par \par prior workup (last seen 3/2021)\par \par prior workup consisted of a CT scan and STD panel\par all normal except for a liver lesion that required an MRI for further characterization\par - MRI was performed 4/2021\par scattered subcentimeter simple cysts and 1.2 cm caudate lobe \par \par 3/2020\par negative UA and culture\par PSA 0.98 and 25% free\par semen culture no growth\par \par MRI 4/2021\par \par IMPRESSION:\par 1. Scattered subcentimeter simple hepatic cysts and a 1.2 cm caudate lobe hemangioma; no suspicious hepatic lesions.\par 2. Otherwise, unremarkable MRI abdomen.

## 2022-07-22 LAB
PSA FREE FLD-MCNC: 13 %
PSA FREE SERPL-MCNC: 0.27 NG/ML
PSA SERPL-MCNC: 2.09 NG/ML

## 2022-07-25 ENCOUNTER — NON-APPOINTMENT (OUTPATIENT)
Age: 52
End: 2022-07-25

## 2022-09-01 ENCOUNTER — APPOINTMENT (OUTPATIENT)
Dept: UROLOGY | Facility: CLINIC | Age: 52
End: 2022-09-01

## 2022-09-01 VITALS
HEART RATE: 73 BPM | WEIGHT: 168 LBS | SYSTOLIC BLOOD PRESSURE: 140 MMHG | TEMPERATURE: 98 F | DIASTOLIC BLOOD PRESSURE: 88 MMHG | HEIGHT: 67 IN | BODY MASS INDEX: 26.37 KG/M2

## 2022-09-01 DIAGNOSIS — R97.20 ELEVATED PROSTATE, SPECIFIC ANTIGEN [PSA]: ICD-10-CM

## 2022-09-01 PROCEDURE — 99214 OFFICE O/P EST MOD 30 MIN: CPT

## 2022-09-02 LAB
ANION GAP SERPL CALC-SCNC: 12 MMOL/L
APPEARANCE: CLEAR
BILIRUBIN URINE: NEGATIVE
BLOOD URINE: NEGATIVE
BUN SERPL-MCNC: 13 MG/DL
CALCIUM SERPL-MCNC: 9.8 MG/DL
CHLORIDE SERPL-SCNC: 104 MMOL/L
CO2 SERPL-SCNC: 24 MMOL/L
COLOR: YELLOW
CREAT SERPL-MCNC: 1.2 MG/DL
EGFR: 74 ML/MIN/1.73M2
GLUCOSE QUALITATIVE U: NEGATIVE
GLUCOSE SERPL-MCNC: 106 MG/DL
KETONES URINE: NEGATIVE
LEUKOCYTE ESTERASE URINE: NEGATIVE
NITRITE URINE: NEGATIVE
PH URINE: 6
POTASSIUM SERPL-SCNC: 4.6 MMOL/L
PROTEIN URINE: NORMAL
SODIUM SERPL-SCNC: 140 MMOL/L
SPECIFIC GRAVITY URINE: 1.03
UROBILINOGEN URINE: NORMAL

## 2022-09-02 NOTE — ASSESSMENT
[FreeTextEntry1] : 52-year-old -American male with rising PSA for the last year.\par PSA this year is 2.09 ng/mL and PSA last year was 1.07 ng/mL.\par PSA is elevated for patients age. \par His urinary symptoms are under control with tamsulosin 0.4 mg daily.\par \par I reviewed the PSA with the patient and given rising PSA and age, recommend MRI of prostate to assess for suspicious prostatic lesions that can be targeted on MRI guided fusion biopsy.  We will also be able to accurately measure the size of the prostate with the MRI and calculate PSA density.\par \par Plan\par -PSA and basic metabolic panel\par -UA and U Culture. \par -MRI of prostate\par -Follow-up 3 to 4 weeks TeleMed to review

## 2022-09-02 NOTE — ADDENDUM
[FreeTextEntry1] : Documented by LATHA William acting as a scribe for Dr. Edson Reese \par \par All medical record entries made by the Scribe were at my, Dr. Reese direction and\par personally dictated by me.  I have reviewed the chart and agree that the record\par accurately reflects my personal performance of the history, physical exam, procedure and imaging.  \par  \par \par

## 2022-09-02 NOTE — HISTORY OF PRESENT ILLNESS
[FreeTextEntry1] : Alfred is a 52-year-old -American male with prior complaints of suprapubic pain and discomfort and suspected prostatitis in the past.  Symptoms have been alleviated with tamsulosin 0.4 mg daily.\par \par Patient presents to office today to review his most recent PSA.  PSA done July 2022 was 2.09 ng/mL with 13% free.  1 year ago PSA was 1.07 ng/mL with 26% free.  2 years ago PSA was 0.98 ng/mL.\par \par Patient does report urinary frequency.  He is adhering to behavioral modifications.  He has decreased caffeine intake has decreased energy drink intake.\par \par Prior imaging:\par prior workup consisted of a CT scan and STD panel\par all normal except for a liver lesion that required an MRI for further characterization\par - MRI abdomen was performed 4/2021\par scattered subcentimeter simple cysts and 1.2 cm caudate lobe \par

## 2022-09-06 LAB
BACTERIA UR CULT: NORMAL
PSA FREE FLD-MCNC: 15 %
PSA FREE SERPL-MCNC: 0.27 NG/ML
PSA SERPL-MCNC: 1.76 NG/ML

## 2022-10-03 ENCOUNTER — APPOINTMENT (OUTPATIENT)
Dept: UROLOGY | Facility: CLINIC | Age: 52
End: 2022-10-03

## 2023-01-19 ENCOUNTER — APPOINTMENT (OUTPATIENT)
Dept: UROLOGY | Facility: CLINIC | Age: 53
End: 2023-01-19
Payer: MEDICAID

## 2023-01-19 VITALS
HEIGHT: 67 IN | DIASTOLIC BLOOD PRESSURE: 80 MMHG | BODY MASS INDEX: 33.74 KG/M2 | SYSTOLIC BLOOD PRESSURE: 120 MMHG | WEIGHT: 215 LBS

## 2023-01-19 DIAGNOSIS — R97.20 ELEVATED PROSTATE, SPECIFIC ANTIGEN [PSA]: ICD-10-CM

## 2023-01-19 DIAGNOSIS — N13.8 BENIGN PROSTATIC HYPERPLASIA WITH LOWER URINARY TRACT SYMPMS: ICD-10-CM

## 2023-01-19 DIAGNOSIS — N40.1 BENIGN PROSTATIC HYPERPLASIA WITH LOWER URINARY TRACT SYMPMS: ICD-10-CM

## 2023-01-19 PROCEDURE — 99214 OFFICE O/P EST MOD 30 MIN: CPT

## 2023-01-19 RX ORDER — TAMSULOSIN HYDROCHLORIDE 0.4 MG/1
0.4 CAPSULE ORAL
Qty: 30 | Refills: 11 | Status: COMPLETED | COMMUNITY
Start: 2021-02-04 | End: 2023-01-19

## 2023-01-21 PROBLEM — N40.1 BENIGN LOCALIZED HYPERPLASIA OF PROSTATE WITH URINARY OBSTRUCTION: Status: ACTIVE | Noted: 2023-01-21

## 2023-01-21 PROBLEM — R97.20 ELEVATED PROSTATE SPECIFIC ANTIGEN (PSA): Status: ACTIVE | Noted: 2022-09-02

## 2023-01-21 NOTE — HISTORY OF PRESENT ILLNESS
[FreeTextEntry1] : Alfred is a 52 year old  male with prior complaints of suprapubic pain and discomfort and suspected prostatitis in the past. He has been managed on tamsulosin 0.4 mg daily which patient reports is not helping with urinary frequency. He continues to adhere to behavioral modifications including decreasing caffeine intake. Patient request to try a different medication. \par \par Last visit, patient PSA increased from prior year. \par PSA July 2022 was 2.09 ng/mL\par PSA 2021 1.07 ng/mL\par PSA 2020 0.98 ng/mL \par \par Most recent PSA done 09/2022- 1.76 ng/mL. \par BMP glucose 106. eGFR 74 and creatinine 1.2. \par \par MRI of prostate 12/28/2022\par -No findings on MRI suspicious for prostate tumor. PIRADS 1. \par -diffuse T2 hypointensity and avid enhancement in the peripheral zone, possibly inflammatory.

## 2023-01-21 NOTE — ASSESSMENT
[FreeTextEntry1] : 52-year-old -American male with rising PSA for the last year.\par Repeat PSA has decreased and MRI revealed PIRADS 1 and evidence of inflammation. This was reviewed with patient in detail. \par \par Patient states that he continues to have urinary frequency despite tamsulosin and he requests to try a different medication. He denies ever having a heart attack and denies taking any nitroglycerine medication. \par Will try daily Tadalafil 5 mg for BPH. Side effects of tadalafil reviewed with patient. \par \par Plan\par -PSA in 1 year\par -Follow up 1 year\par -Patient to call office in 2 months to discuss medication efficacy. If patient continues to have urinary frequency, patient will be referred for Urodynamic evaluation. \par -discontinue tamsulosin\par -Start Tadalafil 5 mg daily for BPH. \par -Follow up with medical doctor to check sugars. Patient states he has appointment tomorrow.

## 2023-03-29 ENCOUNTER — EMERGENCY (EMERGENCY)
Facility: HOSPITAL | Age: 53
LOS: 0 days | Discharge: HOME | End: 2023-03-30
Attending: EMERGENCY MEDICINE
Payer: MEDICAID

## 2023-03-29 DIAGNOSIS — S61.551A OPEN BITE OF RIGHT WRIST, INITIAL ENCOUNTER: ICD-10-CM
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
## 2023-03-29 PROCEDURE — 99283 EMERGENCY DEPT VISIT LOW MDM: CPT

## 2023-03-29 PROCEDURE — 99282 EMERGENCY DEPT VISIT SF MDM: CPT

## 2023-04-10 NOTE — ED PROVIDER NOTE - NS ED ATTENDING STATEMENT MOD
This was a shared visit with the JONO. I reviewed and verified the documentation and independently performed the documented:

## 2023-04-10 NOTE — ED PROVIDER NOTE - CLINICAL SUMMARY MEDICAL DECISION MAKING FREE TEXT BOX
Dog bite > 24 hours old.  No signs of infection.  No suturable wounds.  DC with Abx and f/u with PMD.  Strict return instructions discussed. Dog bite > 24 hours old.  No signs of infection.  No suturable wounds.  DC with Abx and f/u with PMD.  Strict return instructions discussed.  SEE DOWNTIME CHART FOR FURTHER INFORMATION

## 2024-01-22 ENCOUNTER — APPOINTMENT (OUTPATIENT)
Dept: UROLOGY | Facility: CLINIC | Age: 54
End: 2024-01-22
Payer: MEDICAID

## 2024-01-22 VITALS
WEIGHT: 215 LBS | RESPIRATION RATE: 18 BRPM | TEMPERATURE: 97.8 F | SYSTOLIC BLOOD PRESSURE: 136 MMHG | HEART RATE: 86 BPM | DIASTOLIC BLOOD PRESSURE: 87 MMHG | HEIGHT: 67 IN | BODY MASS INDEX: 33.74 KG/M2

## 2024-01-22 PROCEDURE — 99214 OFFICE O/P EST MOD 30 MIN: CPT | Mod: 25

## 2024-01-22 PROCEDURE — 51798 US URINE CAPACITY MEASURE: CPT

## 2024-01-22 RX ORDER — TADALAFIL 5 MG/1
5 TABLET ORAL
Qty: 90 | Refills: 3 | Status: ACTIVE | COMMUNITY
Start: 2024-01-22 | End: 1900-01-01

## 2024-01-22 RX ORDER — MIRABEGRON 25 MG/1
25 TABLET, FILM COATED, EXTENDED RELEASE ORAL
Qty: 30 | Refills: 11 | Status: ACTIVE | COMMUNITY
Start: 2024-01-22 | End: 1900-01-01

## 2024-01-22 RX ORDER — TADALAFIL 5 MG/1
5 TABLET ORAL
Qty: 90 | Refills: 0 | Status: COMPLETED | COMMUNITY
Start: 2023-01-19 | End: 2024-01-22

## 2024-01-22 NOTE — HISTORY OF PRESENT ILLNESS
[FreeTextEntry1] : 53 year old  male with prior complaints of suprapubic pain and discomfort and suspected prostatitis in the past.  He had been managed on tamsulosin 0.4 mg daily which patient reported was not helping with urinary frequency.  This was changed for Tadalafil daily 5 mg - with improvement in night time voiding but no improvement in daytime frequency this is problematic since he is a   He continues to adhere to behavioral modifications including decreasing caffeine intake. Patient request to try a different medication.   Last visit, patient PSA increased from prior year.  PSA drawn this weekend - pending PSA done 09/2022- 1.76 ng/mL.  PSA July 2022 was 2.09 ng/mL PSA 2021 1.07 ng/mL PSA 2020 0.98 ng/mL   MRI of prostate 12/28/2022 -No findings on MRI suspicious for prostate tumor. PIRADS 1.  -diffuse T2 hypointensity and avid enhancement in the peripheral zone, possibly inflammatory.   PVR 80 cc today

## 2024-01-22 NOTE — ASSESSMENT
[FreeTextEntry1] : 53 year old  male with prior complaints of suprapubic pain and discomfort and suspected prostatitis in the past.  He had been managed on tamsulosin 0.4 mg daily which patient reported was not helping with urinary frequency.  This was changed for Tadalafil daily 5 mg - with improvement in night time voiding but no improvement in daytime frequency this is problematic since he is a   He continues to adhere to behavioral modifications including decreasing caffeine intake. Patient request to try a different medication.   Last visit, patient PSA increased from prior year.  PSA drawn this weekend - pending PSA done 09/2022- 1.76 ng/mL.  PSA July 2022 was 2.09 ng/mL PSA 2021 1.07 ng/mL PSA 2020 0.98 ng/mL   MRI of prostate 12/28/2022 -No findings on MRI suspicious for prostate tumor. PIRADS 1.  -diffuse T2 hypointensity and avid enhancement in the peripheral zone, possibly inflammatory.   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

## 2024-02-01 LAB
APPEARANCE: CLEAR
BILIRUBIN URINE: NEGATIVE
BLOOD URINE: NEGATIVE
COLOR: YELLOW
GLUCOSE QUALITATIVE U: NEGATIVE MG/DL
KETONES URINE: NEGATIVE MG/DL
LEUKOCYTE ESTERASE URINE: NEGATIVE
NITRITE URINE: NEGATIVE
PH URINE: 5.5
PROTEIN URINE: NEGATIVE MG/DL
SPECIFIC GRAVITY URINE: 1.02
UROBILINOGEN URINE: 0.2 MG/DL

## 2024-03-04 ENCOUNTER — APPOINTMENT (OUTPATIENT)
Dept: UROLOGY | Facility: CLINIC | Age: 54
End: 2024-03-04

## 2024-05-10 ENCOUNTER — APPOINTMENT (OUTPATIENT)
Dept: PAIN MANAGEMENT | Facility: CLINIC | Age: 54
End: 2024-05-10

## 2024-05-17 ENCOUNTER — APPOINTMENT (OUTPATIENT)
Dept: ORTHOPEDIC SURGERY | Facility: CLINIC | Age: 54
End: 2024-05-17
Payer: COMMERCIAL

## 2024-05-17 ENCOUNTER — NON-APPOINTMENT (OUTPATIENT)
Age: 54
End: 2024-05-17

## 2024-05-17 DIAGNOSIS — S39.012A STRAIN OF MUSCLE, FASCIA AND TENDON OF LOWER BACK, INITIAL ENCOUNTER: ICD-10-CM

## 2024-05-17 PROCEDURE — 99203 OFFICE O/P NEW LOW 30 MIN: CPT

## 2024-05-17 PROCEDURE — 72110 X-RAY EXAM L-2 SPINE 4/>VWS: CPT

## 2024-05-17 RX ORDER — TIZANIDINE 4 MG/1
4 TABLET ORAL
Qty: 7 | Refills: 0 | Status: ACTIVE | COMMUNITY
Start: 2024-05-17 | End: 1900-01-01

## 2024-05-17 RX ORDER — IBUPROFEN 600 MG/1
600 TABLET, FILM COATED ORAL
Refills: 0 | Status: ACTIVE | COMMUNITY

## 2024-05-17 RX ORDER — IBUPROFEN 800 MG/1
800 TABLET, FILM COATED ORAL 3 TIMES DAILY
Qty: 90 | Refills: 0 | Status: ACTIVE | COMMUNITY
Start: 2024-05-17 | End: 1900-01-01

## 2024-05-17 NOTE — HISTORY OF PRESENT ILLNESS
[de-identified] : 53-year-old male here for evaluation status post right lumbar injury.  Patient reports about 2 to 3 weeks ago he was picking up heavy objects when he felt a strain to his right lumbar musculature.  Reports pain to the area since the time of injury along with shooting pains down his right lower extremity.  He reports difficulty with ambulation since the time of injury.  He was seen initially in the ER where he was prescribed muscle relaxers and anti-inflammatories with relief.  This pain is overall improving but has persisted.

## 2024-05-17 NOTE — IMAGING
[de-identified] : Physical examination of the lumbar spine: No swelling, ecchymosis, erythema appreciated.  Skin is intact.  No midline tenderness is appreciated.  Mild tenderness of the right lumbar musculature and right buttock.  Positive straight leg raise.  Limited range of motion secondary to pain.  No instability is appreciated.  Some weakness appreciated throughout.  Calf is soft and nontender.  Negative Homans' sign.  Sensorimotor intact distally.  Neuro vas intact.  X-rays of lumbar spine taken in the office today:  No acute fractures, subluxations, or dislocations.

## 2024-05-17 NOTE — DISCUSSION/SUMMARY
[de-identified] : My clinical suspicion is high for a strain of the lumbar musculature and a subsequent lumbar radiculopathy given the patient's history, physical examination findings, and x-ray findings.  I recommended anti-inflammatory medication.  Ibuprofen 800 mg sent to patient's pharmacy to be taken as needed for pain. Benefits discussed. Confirmed no contraindication to NSAIDs. Also, tizanidine sent to patient's pharmacy to be taken as needed for pain.  Advised patient that this medication may make the patient drowsy and so to avoid any driving or operating heavy machinery when taking it. Red flag symptoms discussed.  Risks were discussed. Patient expresses full understanding.  Prescription for physical therapy was provided for strengthening, stretching, and range of motion of the lumbar musculature.  I recommended patient rest, ice, compress, and elevate the lower extremities regularly. Encouraged activity modification as tolerable. Encouraged gentle range of motion to avoid stiffness.  All questions and concerns addressed to patient's satisfaction. Patient expresses full understanding of treatment plan. Will give the patient follow-up in 6 weeks with our spine specialist for repeat evaluation and treatment.

## 2024-07-01 ENCOUNTER — APPOINTMENT (OUTPATIENT)
Dept: ORTHOPEDIC SURGERY | Facility: CLINIC | Age: 54
End: 2024-07-01
Payer: COMMERCIAL

## 2024-07-01 DIAGNOSIS — S39.012D STRAIN OF MUSCLE, FASCIA AND TENDON OF LOWER BACK, SUBSEQUENT ENCOUNTER: ICD-10-CM

## 2024-07-01 PROBLEM — M54.16 LUMBAR RADICULAR PAIN: Status: ACTIVE | Noted: 2024-07-01

## 2024-07-01 PROCEDURE — 99213 OFFICE O/P EST LOW 20 MIN: CPT

## 2024-07-09 ENCOUNTER — APPOINTMENT (OUTPATIENT)
Dept: MRI IMAGING | Facility: CLINIC | Age: 54
End: 2024-07-09
Payer: COMMERCIAL

## 2024-07-09 PROCEDURE — 72148 MRI LUMBAR SPINE W/O DYE: CPT

## 2024-07-19 ENCOUNTER — NON-APPOINTMENT (OUTPATIENT)
Age: 54
End: 2024-07-19

## 2024-07-19 ENCOUNTER — APPOINTMENT (OUTPATIENT)
Dept: ORTHOPEDIC SURGERY | Facility: CLINIC | Age: 54
End: 2024-07-19
Payer: COMMERCIAL

## 2024-07-19 DIAGNOSIS — M54.16 RADICULOPATHY, LUMBAR REGION: ICD-10-CM

## 2024-07-19 PROCEDURE — 99203 OFFICE O/P NEW LOW 30 MIN: CPT

## 2024-07-24 ENCOUNTER — APPOINTMENT (OUTPATIENT)
Dept: PAIN MANAGEMENT | Facility: CLINIC | Age: 54
End: 2024-07-24

## 2024-09-04 ENCOUNTER — APPOINTMENT (OUTPATIENT)
Dept: PAIN MANAGEMENT | Facility: CLINIC | Age: 54
End: 2024-09-04
Payer: COMMERCIAL

## 2024-09-04 DIAGNOSIS — M54.50 LOW BACK PAIN, UNSPECIFIED: ICD-10-CM

## 2024-09-04 DIAGNOSIS — S39.012A STRAIN OF MUSCLE, FASCIA AND TENDON OF LOWER BACK, INITIAL ENCOUNTER: ICD-10-CM

## 2024-09-04 PROCEDURE — 99204 OFFICE O/P NEW MOD 45 MIN: CPT

## 2024-09-04 RX ORDER — METHOCARBAMOL 750 MG/1
750 TABLET, FILM COATED ORAL 3 TIMES DAILY
Qty: 180 | Refills: 0 | Status: ACTIVE | COMMUNITY
Start: 2024-09-04 | End: 1900-01-01

## 2024-09-04 NOTE — HISTORY OF PRESENT ILLNESS
[FreeTextEntry1] : This is a 54-year-old male here to establish care for right sided lower back pain. Initially his pain was associated with numbness tingling which has started to dissipate. The pain is present in the right buttock. He works as a  and when he sits still the pain continues to be bothersome.  He is referred to me by Dr. Cazares for pain management consultation. The MRI of the lumbar spine was reviewed with the patient and is documented below. He is attending physical therapy which provides him some relief.  He was utilizing ibuprofen 800 mg which started to impact his stomach. He is requesting an alternative medication for pain at this time. He is currently not working but should be able to return full duty in 4 weeks.

## 2024-09-04 NOTE — PHYSICAL EXAM
[Normal Coordination] : normal coordination [Normal DTR UE/LE] : normal DTR UE/LE  [Normal Sensation] : normal sensation [Normal Mood and Affect] : normal mood and affect [Oriented] : oriented [Able to Communicate] : able to communicate [Well Developed] : well developed [Well Nourished] : well nourished [Flexion] : flexion [Extension] : extension [] : negative sitting straight leg raise

## 2024-09-04 NOTE — DATA REVIEWED
[FreeTextEntry1] : MRI of the lumbar spine dated 7/9/2024 demonstrates mild straightening of sagittal lordosis.  Mild L5-S1 spinal thesis with mild bulging of the annulus.  Central disc herniation indents the thecal sac and mildly impinges on both descending S1 roots.  L4-5 mild spondylosis without root compression.

## 2024-09-04 NOTE — ASSESSMENT
[FreeTextEntry1] : This is a 54-year-old male here to establish care for lower back pain.  Initially his pain was associated with radicular features into the right lower extremity.  This is since dissipated and he only notes pain in the right buttock.  The pain has been ongoing for 3 months and has improved slightly with physical therapy.  I will send Robaxin to the pharmacy for symptom control and he will follow-up in 4 weeks for reassessment.  He was given a note keeping him out of work for the next 4 weeks however I anticipate that at his next follow-up he will be able to return to work full duty.  All this patient's questions were answered and the conversation was understood well.  Entered by Shellie Desir, acting as scribe for Dr. Stuart.  Documentation recorded by the scribe, in my presence, accurately reflects the service I personally performed, and the decisions made by me with my edits as appropriate.     Thank you for allowing me to assist in the management of this patient.     Best Regards,     Moriah Stuart M.D., FAAPMR     Diplomate, American Board of Physical Medicine and Rehabilitation Diplomate, American Board of Pain Medicine

## 2024-10-03 ENCOUNTER — APPOINTMENT (OUTPATIENT)
Dept: PAIN MANAGEMENT | Facility: CLINIC | Age: 54
End: 2024-10-03
Payer: COMMERCIAL

## 2024-10-03 DIAGNOSIS — M54.50 LOW BACK PAIN, UNSPECIFIED: ICD-10-CM

## 2024-10-03 DIAGNOSIS — S39.012A STRAIN OF MUSCLE, FASCIA AND TENDON OF LOWER BACK, INITIAL ENCOUNTER: ICD-10-CM

## 2024-10-03 PROCEDURE — 99214 OFFICE O/P EST MOD 30 MIN: CPT

## 2024-10-03 NOTE — HISTORY OF PRESENT ILLNESS
[FreeTextEntry1] : ORIGINAL PRESENTATION: This is a 54-year-old male here to establish care for right sided lower back pain. Initially his pain was associated with numbness tingling which has started to dissipate. The pain is present in the right buttock. He works as a  and when he sits still the pain continues to be bothersome.  He is referred to me by Dr. Cazares for pain management consultation. The MRI of the lumbar spine was reviewed with the patient and is documented below. He is attending physical therapy which provides him some relief.  He was utilizing ibuprofen 800 mg which started to impact his stomach. He is requesting an alternative medication for pain at this time. He is currently not working but should be able to return full duty in 4 weeks.  PATIENT PRESENTS FOR FOLLOW UP: He is under our care for right sided lower back pain. The pain is present in the right buttock.  Initially his pain was associated with numbness tingling which has started to dissipate. He was provided with Methocarbamol 750 mg 2 tabs TID which causes some dizziness. I advise that he not take the medication when driving. He believes he is able to return to work and I will fill out his paperwork.

## 2024-10-03 NOTE — ASSESSMENT
[FreeTextEntry1] : This is a 54-year-old male here to establish care for lower back pain.  Initially his pain was associated with radicular features into the right lower extremity. This has since dissipated, and he only notes pain in the right buttock.  He is taking Methocarbamol 750 mg 2 tabs TID. Overall, his pain is improving with conservative measures. He will return to work full duty no restrictions. Paperwork was filled out and scanned into his chart. He will follow up in 2 months for reassessment.  All this patient's questions were answered and the conversation was understood well.  Entered by Shellie Desir, acting as scribe for Dr. Stuart.  Documentation recorded by the scribe, in my presence, accurately reflects the service I personally performed, and the decisions made by me with my edits as appropriate.     Thank you for allowing me to assist in the management of this patient.     Best Regards,     Moriah Stuart M.D., FAAPMR     Diplomate, American Board of Physical Medicine and Rehabilitation Diplomate, American Board of Pain Medicine

## 2024-10-25 ENCOUNTER — NON-APPOINTMENT (OUTPATIENT)
Age: 54
End: 2024-10-25

## 2024-11-14 PROBLEM — Z78.9 PATIENT DENIES MEDICAL PROBLEMS: Status: RESOLVED | Noted: 2024-11-14 | Resolved: 2024-11-14

## 2024-11-21 ENCOUNTER — APPOINTMENT (OUTPATIENT)
Dept: GASTROENTEROLOGY | Facility: CLINIC | Age: 54
End: 2024-11-21
Payer: COMMERCIAL

## 2024-11-21 VITALS — HEIGHT: 67 IN | WEIGHT: 217 LBS | BODY MASS INDEX: 34.06 KG/M2

## 2024-11-21 DIAGNOSIS — Z12.11 ENCOUNTER FOR SCREENING FOR MALIGNANT NEOPLASM OF COLON: ICD-10-CM

## 2024-11-21 DIAGNOSIS — Z78.9 OTHER SPECIFIED HEALTH STATUS: ICD-10-CM

## 2024-11-21 DIAGNOSIS — K21.9 GASTRO-ESOPHAGEAL REFLUX DISEASE W/OUT ESOPHAGITIS: ICD-10-CM

## 2024-11-21 PROCEDURE — 99204 OFFICE O/P NEW MOD 45 MIN: CPT

## 2024-11-21 RX ORDER — SODIUM SULFATE, POTASSIUM SULFATE AND MAGNESIUM SULFATE 1.6; 3.13; 17.5 G/177ML; G/177ML; G/177ML
17.5-3.13-1.6 SOLUTION ORAL
Qty: 1 | Refills: 0 | Status: ACTIVE | COMMUNITY
Start: 2024-11-21 | End: 1900-01-01

## 2024-12-06 ENCOUNTER — APPOINTMENT (OUTPATIENT)
Dept: PAIN MANAGEMENT | Facility: CLINIC | Age: 54
End: 2024-12-06

## 2025-04-09 ENCOUNTER — RESULT REVIEW (OUTPATIENT)
Age: 55
End: 2025-04-09

## 2025-04-09 ENCOUNTER — TRANSCRIPTION ENCOUNTER (OUTPATIENT)
Age: 55
End: 2025-04-09

## 2025-04-09 ENCOUNTER — OUTPATIENT (OUTPATIENT)
Dept: OUTPATIENT SERVICES | Facility: HOSPITAL | Age: 55
LOS: 1 days | Discharge: HOME | End: 2025-04-09
Payer: COMMERCIAL

## 2025-04-09 VITALS
OXYGEN SATURATION: 97 % | HEART RATE: 98 BPM | SYSTOLIC BLOOD PRESSURE: 140 MMHG | RESPIRATION RATE: 22 BRPM | DIASTOLIC BLOOD PRESSURE: 70 MMHG

## 2025-04-09 VITALS
SYSTOLIC BLOOD PRESSURE: 142 MMHG | WEIGHT: 218.04 LBS | TEMPERATURE: 97 F | HEART RATE: 60 BPM | RESPIRATION RATE: 18 BRPM | HEIGHT: 67 IN | DIASTOLIC BLOOD PRESSURE: 82 MMHG | OXYGEN SATURATION: 100 %

## 2025-04-09 DIAGNOSIS — K21.9 GASTRO-ESOPHAGEAL REFLUX DISEASE WITHOUT ESOPHAGITIS: ICD-10-CM

## 2025-04-09 PROCEDURE — 88313 SPECIAL STAINS GROUP 2: CPT | Mod: 26

## 2025-04-09 PROCEDURE — 43239 EGD BIOPSY SINGLE/MULTIPLE: CPT | Mod: XS

## 2025-04-09 PROCEDURE — 88313 SPECIAL STAINS GROUP 2: CPT

## 2025-04-09 PROCEDURE — 88312 SPECIAL STAINS GROUP 1: CPT | Mod: 26

## 2025-04-09 PROCEDURE — 88312 SPECIAL STAINS GROUP 1: CPT

## 2025-04-09 PROCEDURE — 45380 COLONOSCOPY AND BIOPSY: CPT

## 2025-04-09 PROCEDURE — 88305 TISSUE EXAM BY PATHOLOGIST: CPT | Mod: 26

## 2025-04-09 PROCEDURE — 88305 TISSUE EXAM BY PATHOLOGIST: CPT

## 2025-04-09 RX ORDER — SODIUM CHLORIDE 9 G/1000ML
1000 INJECTION, SOLUTION INTRAVENOUS
Refills: 0 | Status: DISCONTINUED | OUTPATIENT
Start: 2025-04-09 | End: 2025-04-09

## 2025-04-09 NOTE — ASU DISCHARGE PLAN (ADULT/PEDIATRIC) - CARE PROVIDER_API CALL
Betty Howell  Gastroenterology  [address] [place] [address]  Phone: [phone]  Fax: [phone]  Follow Up Time:

## 2025-04-09 NOTE — ASU DISCHARGE PLAN (ADULT/PEDIATRIC) - NS MD DC FALL RISK RISK
For information on Fall & Injury Prevention, visit: [email or web address] OR  [email or web address] OR  https://www.cdc.gov/steadi/patient.html

## 2025-04-09 NOTE — ASU DISCHARGE PLAN (ADULT/PEDIATRIC) - FINANCIAL ASSISTANCE
[facility] provides services at a reduced cost to those who are determined to be eligible through [facility]’s financial assistance program. Information regarding [facility]’s financial assistance program can be found by going to [email or web address] or by calling [phone].

## 2025-04-09 NOTE — ASU PREOP CHECKLIST - LAST TOOK
clears Joanna Ramirez NP in Family Health  [address] [facility] [address], [address]  [place] [id number]  Phone: [phone]  Fax: [phone]  Follow Up Time: Routine

## 2025-04-11 LAB — SURGICAL PATHOLOGY STUDY: SIGNIFICANT CHANGE UP

## 2025-04-14 LAB — SURGICAL PATHOLOGY STUDY: SIGNIFICANT CHANGE UP

## 2025-04-16 DIAGNOSIS — Z12.11 ENCOUNTER FOR SCREENING FOR MALIGNANT NEOPLASM OF COLON: ICD-10-CM

## 2025-04-16 DIAGNOSIS — K29.50 UNSPECIFIED CHRONIC GASTRITIS WITHOUT BLEEDING: ICD-10-CM

## 2025-04-16 DIAGNOSIS — K64.4 RESIDUAL HEMORRHOIDAL SKIN TAGS: ICD-10-CM

## 2025-04-16 DIAGNOSIS — K44.9 DIAPHRAGMATIC HERNIA WITHOUT OBSTRUCTION OR GANGRENE: ICD-10-CM

## 2025-04-16 DIAGNOSIS — K57.30 DIVERTICULOSIS OF LARGE INTESTINE WITHOUT PERFORATION OR ABSCESS WITHOUT BLEEDING: ICD-10-CM

## 2025-04-16 DIAGNOSIS — K29.80 DUODENITIS WITHOUT BLEEDING: ICD-10-CM

## 2025-04-16 DIAGNOSIS — K63.5 POLYP OF COLON: ICD-10-CM

## 2025-05-20 ENCOUNTER — NON-APPOINTMENT (OUTPATIENT)
Age: 55
End: 2025-05-20

## 2025-05-20 ENCOUNTER — APPOINTMENT (OUTPATIENT)
Dept: GASTROENTEROLOGY | Facility: CLINIC | Age: 55
End: 2025-05-20
Payer: COMMERCIAL

## 2025-05-20 DIAGNOSIS — A04.8 OTHER SPECIFIED BACTERIAL INTESTINAL INFECTIONS: ICD-10-CM

## 2025-05-20 DIAGNOSIS — Z78.9 OTHER SPECIFIED HEALTH STATUS: ICD-10-CM

## 2025-05-20 DIAGNOSIS — K63.5 POLYP OF COLON: ICD-10-CM

## 2025-05-20 DIAGNOSIS — K31.A0 GASTRIC INTESTINAL METAPLASIA, UNSPECIFIED: ICD-10-CM

## 2025-05-20 PROCEDURE — 99214 OFFICE O/P EST MOD 30 MIN: CPT

## 2025-05-20 RX ORDER — PANTOPRAZOLE 40 MG/1
40 TABLET, DELAYED RELEASE ORAL TWICE DAILY
Qty: 28 | Refills: 0 | Status: ACTIVE | COMMUNITY
Start: 2025-05-20 | End: 1900-01-01

## 2025-05-20 RX ORDER — TETRACYCLINE HYDROCHLORIDE 500 MG/1
500 CAPSULE ORAL EVERY 6 HOURS
Qty: 56 | Refills: 0 | Status: ACTIVE | COMMUNITY
Start: 2025-05-20 | End: 1900-01-01

## 2025-05-20 RX ORDER — BISMUTH SUBSALICYLATE 262 MG/1
262 TABLET, CHEWABLE ORAL EVERY 6 HOURS
Qty: 112 | Refills: 0 | Status: ACTIVE | COMMUNITY
Start: 2025-05-20 | End: 1900-01-01

## 2025-05-20 RX ORDER — METRONIDAZOLE 500 MG/1
500 TABLET ORAL EVERY 6 HOURS
Qty: 56 | Refills: 0 | Status: ACTIVE | COMMUNITY
Start: 2025-05-20 | End: 1900-01-01

## 2025-09-12 ENCOUNTER — APPOINTMENT (OUTPATIENT)
Dept: PODIATRY | Facility: CLINIC | Age: 55
End: 2025-09-12

## 2025-09-12 DIAGNOSIS — M79.671 PAIN IN RIGHT FOOT: ICD-10-CM

## 2025-09-12 DIAGNOSIS — G62.89 OTHER SPECIFIED POLYNEUROPATHIES: ICD-10-CM

## 2025-09-12 DIAGNOSIS — L85.2 KERATOSIS PUNCTATA (PALMARIS ET PLANTARIS): ICD-10-CM

## 2025-09-12 PROCEDURE — 99203 OFFICE O/P NEW LOW 30 MIN: CPT

## 2025-09-16 PROBLEM — M79.671 RIGHT FOOT PAIN: Status: ACTIVE | Noted: 2025-09-16

## 2025-09-16 PROBLEM — L85.2: Status: ACTIVE | Noted: 2025-09-16

## 2025-09-16 PROBLEM — G62.89 OTHER POLYNEUROPATHY: Status: ACTIVE | Noted: 2025-09-16
